# Patient Record
Sex: FEMALE | Race: WHITE | NOT HISPANIC OR LATINO | URBAN - NONMETROPOLITAN AREA
[De-identification: names, ages, dates, MRNs, and addresses within clinical notes are randomized per-mention and may not be internally consistent; named-entity substitution may affect disease eponyms.]

---

## 2018-07-03 PROBLEM — H52.13: Noted: 2018-07-03

## 2018-07-03 PROBLEM — H25.13: Noted: 2018-07-03

## 2019-07-09 ENCOUNTER — IMPORTED ENCOUNTER (OUTPATIENT)
Dept: URBAN - NONMETROPOLITAN AREA CLINIC 1 | Facility: CLINIC | Age: 64
End: 2019-07-09

## 2019-07-09 PROCEDURE — 92014 COMPRE OPH EXAM EST PT 1/>: CPT

## 2019-07-09 PROCEDURE — 92015 DETERMINE REFRACTIVE STATE: CPT

## 2019-07-09 NOTE — PATIENT DISCUSSION
PVD ou LONGSTANDING stable todayEDUCATE PT [facility]*CATARACT OBSERVE-CHANGE GLASSES:.-  We discussed the patients cataract(s) in detail with the patient. -  The patient/ physician elected to wait on surgery at this time. -  The patient was informed of the symptoms related to cataract progression.-  The patient was given a prescription for new glasses.

## 2020-07-10 ENCOUNTER — IMPORTED ENCOUNTER (OUTPATIENT)
Dept: URBAN - NONMETROPOLITAN AREA CLINIC 1 | Facility: CLINIC | Age: 65
End: 2020-07-10

## 2020-07-10 PROBLEM — H25.13: Noted: 2020-07-10

## 2020-07-10 PROBLEM — H52.13: Noted: 2020-07-10

## 2020-07-10 PROBLEM — H43.813: Noted: 2020-07-10

## 2020-07-10 PROCEDURE — 92014 COMPRE OPH EXAM EST PT 1/>: CPT

## 2020-07-10 PROCEDURE — 92015 DETERMINE REFRACTIVE STATE: CPT

## 2020-07-10 NOTE — PATIENT DISCUSSION
Cataract OU-Not yet surgical. -Reviewed symptoms of advancing cataract growth such as glare and halos and decreased vision.-Continue to monitor for now. Pt will notify us if any new symptoms develop. Myopia-Discussed diagnosis with patient. -Explained that people who are myopic are at a higher risk for developing RD/RT and reviewed associated S&S.-Pt to contact our office if symptoms develop. Presbyopia-Discussed diagnosis with patient. Updated spec Rx given. Recommend lens that will provide comfort as well as protect safety and health of eyes. PVD-Retina flat 360 with no breaks tears or heme.-S&S of RD/RT reviewed with pt.-Stressed that pt should contact our office right away with any changes or increase in symptoms.

## 2021-07-16 ENCOUNTER — IMPORTED ENCOUNTER (OUTPATIENT)
Dept: URBAN - NONMETROPOLITAN AREA CLINIC 1 | Facility: CLINIC | Age: 66
End: 2021-07-16

## 2021-07-16 PROCEDURE — 92014 COMPRE OPH EXAM EST PT 1/>: CPT

## 2021-07-16 PROCEDURE — 92015 DETERMINE REFRACTIVE STATE: CPT

## 2022-04-09 ASSESSMENT — TONOMETRY
OD_IOP_MMHG: 13
OS_IOP_MMHG: 13
OS_IOP_MMHG: 13
OD_IOP_MMHG: 13
OD_IOP_MMHG: 13
OS_IOP_MMHG: 12

## 2022-04-09 ASSESSMENT — VISUAL ACUITY
OS_SC: J1
OS_SC: 20/70
OD_SC: J1
OS_CC: 20/20
OU_CC: 20/20
OU_CC: 20/20
OD_SC: 20/70
OD_CC: 20/20
OU_SC: 20/70
OD_CC: 20/25
OS_CC: 20/20
OS_CC: 20/20
OD_CC: 20/25

## 2022-09-29 ENCOUNTER — ESTABLISHED PATIENT (OUTPATIENT)
Dept: RURAL CLINIC 1 | Facility: CLINIC | Age: 67
End: 2022-09-29

## 2022-09-29 DIAGNOSIS — H25.13: ICD-10-CM

## 2022-09-29 DIAGNOSIS — H43.813: ICD-10-CM

## 2022-09-29 DIAGNOSIS — H52.13: ICD-10-CM

## 2022-09-29 PROCEDURE — 92014 COMPRE OPH EXAM EST PT 1/>: CPT

## 2022-09-29 ASSESSMENT — TONOMETRY
OS_IOP_MMHG: 13
OD_IOP_MMHG: 13

## 2022-09-29 ASSESSMENT — VISUAL ACUITY
OU_CC: 20/30
OD_SC: 20/25
OU_SC: 20/20
OS_SC: 20/30-1

## 2023-09-29 ENCOUNTER — COMPREHENSIVE EXAM (OUTPATIENT)
Dept: RURAL CLINIC 1 | Facility: CLINIC | Age: 68
End: 2023-09-29

## 2023-09-29 DIAGNOSIS — H25.13: ICD-10-CM

## 2023-09-29 DIAGNOSIS — H43.813: ICD-10-CM

## 2023-09-29 PROCEDURE — 92014 COMPRE OPH EXAM EST PT 1/>: CPT

## 2023-09-29 ASSESSMENT — VISUAL ACUITY
OD_SC: 20/20
OU_SC: 20/20
OS_SC: 20/20

## 2023-09-29 ASSESSMENT — TONOMETRY
OD_IOP_MMHG: 14
OS_IOP_MMHG: 14